# Patient Record
Sex: MALE | Race: WHITE
[De-identification: names, ages, dates, MRNs, and addresses within clinical notes are randomized per-mention and may not be internally consistent; named-entity substitution may affect disease eponyms.]

---

## 2018-03-29 ENCOUNTER — HOSPITAL ENCOUNTER (OUTPATIENT)
Dept: HOSPITAL 92 - SDC | Age: 73
Discharge: HOME | End: 2018-03-29
Attending: INTERNAL MEDICINE
Payer: MEDICARE

## 2018-03-29 VITALS — BODY MASS INDEX: 31.8 KG/M2

## 2018-03-29 DIAGNOSIS — I48.4: ICD-10-CM

## 2018-03-29 DIAGNOSIS — I48.0: Primary | ICD-10-CM

## 2018-03-29 DIAGNOSIS — Z98.890: ICD-10-CM

## 2018-03-29 DIAGNOSIS — I25.110: ICD-10-CM

## 2018-03-29 DIAGNOSIS — E78.5: ICD-10-CM

## 2018-03-29 DIAGNOSIS — Z53.8: ICD-10-CM

## 2018-03-29 DIAGNOSIS — Z79.01: ICD-10-CM

## 2018-03-29 DIAGNOSIS — Z87.891: ICD-10-CM

## 2018-03-29 LAB
ANION GAP SERPL CALC-SCNC: 12 MMOL/L (ref 10–20)
APTT PPP: 37.4 SEC (ref 22.9–36.1)
BASOPHILS # BLD AUTO: 0.1 THOU/UL (ref 0–0.2)
BASOPHILS NFR BLD AUTO: 1.1 % (ref 0–1)
BUN SERPL-MCNC: 21 MG/DL (ref 8.4–25.7)
CALCIUM SERPL-MCNC: 9.2 MG/DL (ref 7.8–10.44)
CHLORIDE SERPL-SCNC: 108 MMOL/L (ref 98–107)
CO2 SERPL-SCNC: 24 MMOL/L (ref 23–31)
CREAT CL PREDICTED SERPL C-G-VRATE: 111 ML/MIN (ref 70–130)
EOSINOPHIL # BLD AUTO: 0.1 THOU/UL (ref 0–0.7)
EOSINOPHIL NFR BLD AUTO: 2.4 % (ref 0–10)
GLUCOSE SERPL-MCNC: 111 MG/DL (ref 83–110)
HGB BLD-MCNC: 15.6 G/DL (ref 14–18)
INR PPP: 1.2
LYMPHOCYTES # BLD: 1.9 THOU/UL (ref 1.2–3.4)
LYMPHOCYTES NFR BLD AUTO: 32.6 % (ref 21–51)
MCH RBC QN AUTO: 32.1 PG (ref 27–31)
MCV RBC AUTO: 94.8 FL (ref 80–94)
MONOCYTES # BLD AUTO: 0.5 THOU/UL (ref 0.11–0.59)
MONOCYTES NFR BLD AUTO: 8.3 % (ref 0–10)
NEUTROPHILS # BLD AUTO: 3.2 THOU/UL (ref 1.4–6.5)
NEUTROPHILS NFR BLD AUTO: 55.7 % (ref 42–75)
PLATELET # BLD AUTO: 168 THOU/UL (ref 130–400)
POTASSIUM SERPL-SCNC: 4.4 MMOL/L (ref 3.5–5.1)
PROTHROMBIN TIME: 15 SEC (ref 12–14.7)
RBC # BLD AUTO: 4.85 MILL/UL (ref 4.7–6.1)
SODIUM SERPL-SCNC: 140 MMOL/L (ref 136–145)
WBC # BLD AUTO: 5.8 THOU/UL (ref 4.8–10.8)

## 2018-03-29 PROCEDURE — 85610 PROTHROMBIN TIME: CPT

## 2018-03-29 PROCEDURE — 85025 COMPLETE CBC W/AUTO DIFF WBC: CPT

## 2018-03-29 PROCEDURE — 85730 THROMBOPLASTIN TIME PARTIAL: CPT

## 2018-03-29 PROCEDURE — 80048 BASIC METABOLIC PNL TOTAL CA: CPT

## 2018-03-29 NOTE — CON
DATE OF CONSULTATION:  03/29/2018

 

REASON FOR CONSULTATION:  Recurrent atrial fibrillation and atypical flutter.

 

PHYSICIAN REQUESTING CONSULTATION:  Dr. Maria Elena Doran.

 

HISTORY OF PRESENT ILLNESS:  Arnold Lindsay is a 72-year-old gentleman.  He has a history of hypert
ension and recurrent atrial arrhythmias.  He originally underwent a left-sided ablation for atrial fi
brillation 12 years ago in [place], [place].  He since then was having trouble with recurrent flutters 
and underwent a right-sided flutter ablation with Dr. Rodriguez at [place] 2 years ago.  He did v
tanya well after that, although he was on Multaq therapy following the second ablation.  Recently, schneider
carolyn, he has had numerous breakthrough episodes of atrial arrhythmias despite Multaq therapy.  He has 
a CHADS 2 score of 2 with hypertension and age and is on Eliquis for anticoagulation.  I was asked to
 see him to consider ablative therapy.

 

PAST MEDICAL HISTORY:

1.  Hypertension.

2.  Recurrent atrial arrhythmias.  He previously had an ablation for his atrial fibrillation reported
ly in [place] about 12 years ago and a right-sided flutter ablation 2 years ago at [facility].

4.  CHADS-VASc score of 2, age and hypertension.

 

FAMILY HISTORY:  Significant for cancer, negative for arrhythmias, premature coronary artery disease,
 and diabetes.

 

SOCIAL HISTORY:  The patient is a former smoker, quit in 1985.  Drinks alcohol, generally 1 or 2 glas
ses of wine a day.  Denies any illicit drug use.  He is retired, lives in [place] outside of [facility].

 

REVIEW OF SYSTEMS:  Allergies:  Has no known drug allergies.  Constitutional:  No fever, chills, nigh
t sweats, or other constitutional findings.  HEENT:  No change in vision or hearing.  Neurologic:  No
 TIAs, strokes or seizures.  Pulmonary:  No reactive airway disease, obstructive lung disease, or rec
urrent pneumonias.  Gastrointestinal:  No acid peptic disease, melena, or hematochezia.  Genitourinar
y:  No frequency or dysuria.  Cardiovascular:  No claudication.  Endocrine:  No diabetes or thyroid d
ysfunction.  Hematologic:  No history of blood dyscrasias.  Musculoskeletal:  No arthritis, arthralgi
as or gout.  Psychiatric:  No depression or anxiety.  Dermatologic:  No rashes or skin cancer.  Autoi
mmune:  No autoimmune disease.

 

PHYSICAL EXAMINATION:

VITAL SIGNS:  Blood pressure 130/70, pulse is 70 and regular, respirations 18.

GENERAL:  This is a healthy appearing gentleman in no acute distress.

HEENT:  Extraocular muscles are intact.  Oropharynx moist.

NECK:  Supple, with no JVD.

HEART:  Demonstrates regular rate, normal S1, S2.

LUNGS:  Clear with symmetric breath sounds.

ABDOMEN:  Soft.

EXTREMITIES:  Without edema.

SKIN:  Warm and dry.

NEUROLOGIC:  Exam nonfocal.

 

LABORATORY AND X-RAY FINDINGS:  Electrocardiogram from yesterday demonstrates what was read as flutte
r, but was probably coarse atrial fibrillation or possibly an atypical flutter.  The atrial rhythm ap
pears to be regularized, but not classic CTI flutter.  Ventricular response is fairly regular.  QRS i
s narrow.

 

MEDICAL DECISION MAKING:  I had a 15-minute discussion with Mr. Lindsay, we about all treatment o
ptions and alternatives.  At this point, he is on Multaq for rhythm control and is having documented 
breakthrough arrhythmias despite this, he has had previous ablations of the left atrium 12 years ago 
in [place] and of the right-sided flutter in [place] 2 years ago.  He has a CHADS VASc score 
of 2 and is on anticoagulation with Eliquis appropriately.  We did discuss nonpharmacologic treatment
, particularly ablative therapy which I think could have a relatively high success rate given the par
oxysmal nature of his arrhythmias, although he has been diagnosed with flutter recently, I think that
 the arrhythmia is likely originating from the left atrium.

 

IMPRESSION:

1.  Atrial arrhythmias despite previous ablation for atrial fibrillation 12 years ago in [place] and 
cavotriscuspid isthmus flutter ablation at [place] 2 years ago.  This is also despite Multaq 
therapy.

2.  CHADS-VASc score of 2.

3.  History of hypertension.

 

RECOMMENDATIONS:

1.  I will provide all information regarding ablative therapy.

2.  We will await Mr. Lindsay's decision regarding ablation.

## 2018-03-29 NOTE — PRG
DATE OF SERVICE:  03/29/2018

 

Mr. Lindsay came to the outpatient area for atrial flutter ablation.  He initially was in atrial 
flutter and then converted back to sinus rhythm and then back in atrial fibrillation.  He says he can
 feel his heart rate going back and forth.

 

At this point, therefore there is no point in doing a cardioversion since this is paroxysmal.  We sudeep
l cancel the cardioversion.  Continue current medical regimen.  He will see Dr. Alcantara if possible he
re to consider setting up a repeat ablation.

## 2023-02-21 ENCOUNTER — HOSPITAL ENCOUNTER (OUTPATIENT)
Dept: HOSPITAL 92 - CSHCT | Age: 78
Discharge: HOME | End: 2023-02-21
Payer: MEDICARE

## 2023-02-21 DIAGNOSIS — R58: Primary | ICD-10-CM

## 2023-02-21 LAB — EGFRCR SERPLBLD CKD-EPI 2021: 78 ML/MIN/{1.73_M2}

## 2023-02-21 PROCEDURE — 70491 CT SOFT TISSUE NECK W/DYE: CPT

## 2023-02-21 PROCEDURE — 82565 ASSAY OF CREATININE: CPT
